# Patient Record
Sex: FEMALE | Race: BLACK OR AFRICAN AMERICAN | NOT HISPANIC OR LATINO | URBAN - METROPOLITAN AREA
[De-identification: names, ages, dates, MRNs, and addresses within clinical notes are randomized per-mention and may not be internally consistent; named-entity substitution may affect disease eponyms.]

---

## 2017-09-06 ENCOUNTER — OUTPATIENT (OUTPATIENT)
Dept: OUTPATIENT SERVICES | Facility: HOSPITAL | Age: 47
LOS: 1 days | End: 2017-09-06
Payer: COMMERCIAL

## 2017-09-06 VITALS
SYSTOLIC BLOOD PRESSURE: 120 MMHG | RESPIRATION RATE: 16 BRPM | OXYGEN SATURATION: 99 % | HEIGHT: 65 IN | DIASTOLIC BLOOD PRESSURE: 70 MMHG | TEMPERATURE: 99 F | WEIGHT: 242.95 LBS | HEART RATE: 70 BPM

## 2017-09-06 DIAGNOSIS — D25.9 LEIOMYOMA OF UTERUS, UNSPECIFIED: ICD-10-CM

## 2017-09-06 DIAGNOSIS — E66.9 OBESITY, UNSPECIFIED: ICD-10-CM

## 2017-09-06 DIAGNOSIS — Z98.890 OTHER SPECIFIED POSTPROCEDURAL STATES: Chronic | ICD-10-CM

## 2017-09-06 LAB
BLD GP AB SCN SERPL QL: NEGATIVE — SIGNIFICANT CHANGE UP
BUN SERPL-MCNC: 9 MG/DL — SIGNIFICANT CHANGE UP (ref 7–23)
CALCIUM SERPL-MCNC: 9.3 MG/DL — SIGNIFICANT CHANGE UP (ref 8.4–10.5)
CHLORIDE SERPL-SCNC: 105 MMOL/L — SIGNIFICANT CHANGE UP (ref 98–107)
CO2 SERPL-SCNC: 26 MMOL/L — SIGNIFICANT CHANGE UP (ref 22–31)
CREAT SERPL-MCNC: 0.83 MG/DL — SIGNIFICANT CHANGE UP (ref 0.5–1.3)
GLUCOSE SERPL-MCNC: 107 MG/DL — HIGH (ref 70–99)
HCT VFR BLD CALC: 36.6 % — SIGNIFICANT CHANGE UP (ref 34.5–45)
HGB BLD-MCNC: 11.5 G/DL — SIGNIFICANT CHANGE UP (ref 11.5–15.5)
MCHC RBC-ENTMCNC: 28.8 PG — SIGNIFICANT CHANGE UP (ref 27–34)
MCHC RBC-ENTMCNC: 31.4 % — LOW (ref 32–36)
MCV RBC AUTO: 91.5 FL — SIGNIFICANT CHANGE UP (ref 80–100)
NRBC # FLD: 0 — SIGNIFICANT CHANGE UP
PLATELET # BLD AUTO: 238 K/UL — SIGNIFICANT CHANGE UP (ref 150–400)
PMV BLD: 10.2 FL — SIGNIFICANT CHANGE UP (ref 7–13)
POTASSIUM SERPL-MCNC: 3.6 MMOL/L — SIGNIFICANT CHANGE UP (ref 3.5–5.3)
POTASSIUM SERPL-SCNC: 3.6 MMOL/L — SIGNIFICANT CHANGE UP (ref 3.5–5.3)
RBC # BLD: 4 M/UL — SIGNIFICANT CHANGE UP (ref 3.8–5.2)
RBC # FLD: 13.7 % — SIGNIFICANT CHANGE UP (ref 10.3–14.5)
RH IG SCN BLD-IMP: NEGATIVE — SIGNIFICANT CHANGE UP
SODIUM SERPL-SCNC: 145 MMOL/L — SIGNIFICANT CHANGE UP (ref 135–145)
WBC # BLD: 3.9 K/UL — SIGNIFICANT CHANGE UP (ref 3.8–10.5)
WBC # FLD AUTO: 3.9 K/UL — SIGNIFICANT CHANGE UP (ref 3.8–10.5)

## 2017-09-06 PROCEDURE — 93010 ELECTROCARDIOGRAM REPORT: CPT

## 2017-09-06 RX ORDER — SODIUM CHLORIDE 9 MG/ML
1000 INJECTION, SOLUTION INTRAVENOUS
Qty: 0 | Refills: 0 | Status: DISCONTINUED | OUTPATIENT
Start: 2017-09-20 | End: 2017-09-22

## 2017-09-06 RX ORDER — SODIUM CHLORIDE 9 MG/ML
3 INJECTION INTRAMUSCULAR; INTRAVENOUS; SUBCUTANEOUS EVERY 8 HOURS
Qty: 0 | Refills: 0 | Status: DISCONTINUED | OUTPATIENT
Start: 2017-09-20 | End: 2017-09-22

## 2017-09-06 NOTE — H&P PST ADULT - RS GEN PE MLT RESP DETAILS PC
clear to auscultation bilaterally/good air movement/respirations non-labored/airway patent/breath sounds equal

## 2017-09-06 NOTE — H&P PST ADULT - LYMPHATIC
supraclavicular L/supraclavicular R/posterior cervical L/anterior cervical L/posterior cervical R/anterior cervical R

## 2017-09-06 NOTE — H&P PST ADULT - PROBLEM SELECTOR PLAN 1
Total Hysterectomy, Bilateral Salpingectomy, Right Oophorectomy scheduled for 9/20/2017.  Pre-op instructions given. Pt verbalized understanding.  Pepci Total Hysterectomy, Bilateral Salpingectomy, Right Oophorectomy scheduled for 9/20/2017.  Pre-op instructions given. Pt verbalized understanding.  Pepcid given for GI prophylaxis.  Chlorhexidine wash instructions given.  ABO ordered STAT for day of procedure.  UCG ordered STAT for day of procedure - urine container given.  Accu-Chek ordered STAT for day of procedure.  Medical clearance requested - copy of Echo/Stress results requested if available.

## 2017-09-06 NOTE — H&P PST ADULT - HISTORY OF PRESENT ILLNESS
46yo obese female with medical history fibroids x 2 years with increase in size noted with associated heavy vaginal bleeding. Pt presents today for presurgical evaluation for Total Hysterectomy, Bilateral Salpingectomy, Right Oophorectomy scheduled for 9/20/2017.

## 2017-09-20 ENCOUNTER — INPATIENT (INPATIENT)
Facility: HOSPITAL | Age: 47
LOS: 1 days | Discharge: HOME | End: 2017-09-22
Attending: OBSTETRICS & GYNECOLOGY | Admitting: OBSTETRICS & GYNECOLOGY
Payer: COMMERCIAL

## 2017-09-20 VITALS
HEART RATE: 58 BPM | DIASTOLIC BLOOD PRESSURE: 70 MMHG | HEIGHT: 65 IN | OXYGEN SATURATION: 99 % | TEMPERATURE: 98 F | WEIGHT: 242.95 LBS | SYSTOLIC BLOOD PRESSURE: 120 MMHG | RESPIRATION RATE: 16 BRPM

## 2017-09-20 DIAGNOSIS — D25.9 LEIOMYOMA OF UTERUS, UNSPECIFIED: ICD-10-CM

## 2017-09-20 DIAGNOSIS — Z98.890 OTHER SPECIFIED POSTPROCEDURAL STATES: Chronic | ICD-10-CM

## 2017-09-20 DIAGNOSIS — Z98.890 OTHER SPECIFIED POSTPROCEDURAL STATES: ICD-10-CM

## 2017-09-20 LAB
BASE EXCESS BLDA CALC-SCNC: 1.6 MMOL/L — SIGNIFICANT CHANGE UP
CA-I BLDA-SCNC: 1.19 MMOL/L — SIGNIFICANT CHANGE UP (ref 1.15–1.29)
GLUCOSE BLDA-MCNC: 151 MG/DL — HIGH (ref 70–99)
HCO3 BLDA-SCNC: 26 MMOL/L — SIGNIFICANT CHANGE UP (ref 22–26)
HCT VFR BLD CALC: 29.3 % — LOW (ref 34.5–45)
HCT VFR BLDA CALC: 30.7 % — LOW (ref 34.5–46.5)
HGB BLD-MCNC: 9.7 G/DL — LOW (ref 11.5–15.5)
HGB BLDA-MCNC: 9.9 G/DL — LOW (ref 11.5–15.5)
MCHC RBC-ENTMCNC: 29.7 PG — SIGNIFICANT CHANGE UP (ref 27–34)
MCHC RBC-ENTMCNC: 33.1 % — SIGNIFICANT CHANGE UP (ref 32–36)
MCV RBC AUTO: 89.6 FL — SIGNIFICANT CHANGE UP (ref 80–100)
NRBC # FLD: 0 — SIGNIFICANT CHANGE UP
PCO2 BLDA: 45 MMHG — SIGNIFICANT CHANGE UP (ref 32–48)
PH BLDA: 7.38 PH — SIGNIFICANT CHANGE UP (ref 7.35–7.45)
PLATELET # BLD AUTO: 260 K/UL — SIGNIFICANT CHANGE UP (ref 150–400)
PMV BLD: 9.4 FL — SIGNIFICANT CHANGE UP (ref 7–13)
PO2 BLDA: 68 MMHG — LOW (ref 83–108)
POTASSIUM BLDA-SCNC: 3.6 MMOL/L — SIGNIFICANT CHANGE UP (ref 3.4–4.5)
RBC # BLD: 3.27 M/UL — LOW (ref 3.8–5.2)
RBC # FLD: 13.2 % — SIGNIFICANT CHANGE UP (ref 10.3–14.5)
RH IG SCN BLD-IMP: NEGATIVE — SIGNIFICANT CHANGE UP
SAO2 % BLDA: 91.7 % — LOW (ref 95–99)
SODIUM BLDA-SCNC: 138 MMOL/L — SIGNIFICANT CHANGE UP (ref 136–146)
WBC # BLD: 12.15 K/UL — HIGH (ref 3.8–10.5)
WBC # FLD AUTO: 12.15 K/UL — HIGH (ref 3.8–10.5)

## 2017-09-20 PROCEDURE — 88307 TISSUE EXAM BY PATHOLOGIST: CPT | Mod: 26

## 2017-09-20 RX ORDER — DOCUSATE SODIUM 100 MG
100 CAPSULE ORAL THREE TIMES A DAY
Qty: 0 | Refills: 0 | Status: DISCONTINUED | OUTPATIENT
Start: 2017-09-20 | End: 2017-09-21

## 2017-09-20 RX ORDER — DOCUSATE SODIUM 100 MG
100 CAPSULE ORAL THREE TIMES A DAY
Qty: 0 | Refills: 0 | Status: DISCONTINUED | OUTPATIENT
Start: 2017-09-20 | End: 2017-09-22

## 2017-09-20 RX ORDER — SIMETHICONE 80 MG/1
80 TABLET, CHEWABLE ORAL
Qty: 0 | Refills: 0 | Status: DISCONTINUED | OUTPATIENT
Start: 2017-09-20 | End: 2017-09-22

## 2017-09-20 RX ORDER — NALOXONE HYDROCHLORIDE 4 MG/.1ML
0.1 SPRAY NASAL
Qty: 0 | Refills: 0 | Status: DISCONTINUED | OUTPATIENT
Start: 2017-09-20 | End: 2017-09-22

## 2017-09-20 RX ORDER — HYDROMORPHONE HYDROCHLORIDE 2 MG/ML
30 INJECTION INTRAMUSCULAR; INTRAVENOUS; SUBCUTANEOUS
Qty: 0 | Refills: 0 | Status: DISCONTINUED | OUTPATIENT
Start: 2017-09-20 | End: 2017-09-21

## 2017-09-20 RX ORDER — ONDANSETRON 8 MG/1
4 TABLET, FILM COATED ORAL EVERY 6 HOURS
Qty: 0 | Refills: 0 | Status: DISCONTINUED | OUTPATIENT
Start: 2017-09-20 | End: 2017-09-22

## 2017-09-20 RX ORDER — HYDROMORPHONE HYDROCHLORIDE 2 MG/ML
0.5 INJECTION INTRAMUSCULAR; INTRAVENOUS; SUBCUTANEOUS
Qty: 0 | Refills: 0 | Status: DISCONTINUED | OUTPATIENT
Start: 2017-09-20 | End: 2017-09-21

## 2017-09-20 RX ORDER — ACETAMINOPHEN 500 MG
1000 TABLET ORAL ONCE
Qty: 0 | Refills: 0 | Status: COMPLETED | OUTPATIENT
Start: 2017-09-21 | End: 2017-09-21

## 2017-09-20 RX ORDER — SODIUM CHLORIDE 9 MG/ML
1000 INJECTION, SOLUTION INTRAVENOUS
Qty: 0 | Refills: 0 | Status: DISCONTINUED | OUTPATIENT
Start: 2017-09-20 | End: 2017-09-22

## 2017-09-20 RX ORDER — DIPHENHYDRAMINE HCL 50 MG
25 CAPSULE ORAL EVERY 4 HOURS
Qty: 0 | Refills: 0 | Status: DISCONTINUED | OUTPATIENT
Start: 2017-09-20 | End: 2017-09-22

## 2017-09-20 RX ADMIN — HYDROMORPHONE HYDROCHLORIDE 0.5 MILLIGRAM(S): 2 INJECTION INTRAMUSCULAR; INTRAVENOUS; SUBCUTANEOUS at 21:30

## 2017-09-20 RX ADMIN — HYDROMORPHONE HYDROCHLORIDE 0.5 MILLIGRAM(S): 2 INJECTION INTRAMUSCULAR; INTRAVENOUS; SUBCUTANEOUS at 21:15

## 2017-09-20 RX ADMIN — SODIUM CHLORIDE 125 MILLILITER(S): 9 INJECTION, SOLUTION INTRAVENOUS at 23:12

## 2017-09-20 RX ADMIN — SIMETHICONE 80 MILLIGRAM(S): 80 TABLET, CHEWABLE ORAL at 23:58

## 2017-09-20 RX ADMIN — SODIUM CHLORIDE 125 MILLILITER(S): 9 INJECTION, SOLUTION INTRAVENOUS at 20:16

## 2017-09-20 RX ADMIN — HYDROMORPHONE HYDROCHLORIDE 30 MILLILITER(S): 2 INJECTION INTRAMUSCULAR; INTRAVENOUS; SUBCUTANEOUS at 22:00

## 2017-09-20 RX ADMIN — HYDROMORPHONE HYDROCHLORIDE 30 MILLILITER(S): 2 INJECTION INTRAMUSCULAR; INTRAVENOUS; SUBCUTANEOUS at 23:13

## 2017-09-20 NOTE — BRIEF OPERATIVE NOTE - PROCEDURE
<<-----Click on this checkbox to enter Procedure Myomectomy by abdominal approach  09/20/2017    Active  YOLANDA  Lysis of adhesions  09/20/2017    Active  YOLANDA  Right oophorectomy  09/20/2017    Active  YOLANDA  Hysterectomy, abdominal, total, with bilateral salpingectomy  09/20/2017    Active  YOLANDA

## 2017-09-20 NOTE — BRIEF OPERATIVE NOTE - OPERATION/FINDINGS
26 week size fibroid uterus. dense peritoneal, omental, bowel adhesions to uterus.  Ovaries and fallopian tubes adherent to uterus.

## 2017-09-21 LAB
BUN SERPL-MCNC: 9 MG/DL — SIGNIFICANT CHANGE UP (ref 7–23)
CALCIUM SERPL-MCNC: 8.4 MG/DL — SIGNIFICANT CHANGE UP (ref 8.4–10.5)
CHLORIDE SERPL-SCNC: 100 MMOL/L — SIGNIFICANT CHANGE UP (ref 98–107)
CO2 SERPL-SCNC: 24 MMOL/L — SIGNIFICANT CHANGE UP (ref 22–31)
CREAT SERPL-MCNC: 0.94 MG/DL — SIGNIFICANT CHANGE UP (ref 0.5–1.3)
GLUCOSE SERPL-MCNC: 180 MG/DL — HIGH (ref 70–99)
HCG UR QL: NEGATIVE — SIGNIFICANT CHANGE UP
HCT VFR BLD CALC: 26.6 % — LOW (ref 34.5–45)
HGB BLD-MCNC: 8.7 G/DL — LOW (ref 11.5–15.5)
MCHC RBC-ENTMCNC: 29 PG — SIGNIFICANT CHANGE UP (ref 27–34)
MCHC RBC-ENTMCNC: 32.7 % — SIGNIFICANT CHANGE UP (ref 32–36)
MCV RBC AUTO: 88.7 FL — SIGNIFICANT CHANGE UP (ref 80–100)
NRBC # FLD: 0 — SIGNIFICANT CHANGE UP
PLATELET # BLD AUTO: 255 K/UL — SIGNIFICANT CHANGE UP (ref 150–400)
PMV BLD: 9.5 FL — SIGNIFICANT CHANGE UP (ref 7–13)
POTASSIUM SERPL-MCNC: 4 MMOL/L — SIGNIFICANT CHANGE UP (ref 3.5–5.3)
POTASSIUM SERPL-SCNC: 4 MMOL/L — SIGNIFICANT CHANGE UP (ref 3.5–5.3)
RBC # BLD: 3 M/UL — LOW (ref 3.8–5.2)
RBC # FLD: 13.3 % — SIGNIFICANT CHANGE UP (ref 10.3–14.5)
SODIUM SERPL-SCNC: 139 MMOL/L — SIGNIFICANT CHANGE UP (ref 135–145)
WBC # BLD: 11.31 K/UL — HIGH (ref 3.8–10.5)
WBC # FLD AUTO: 11.31 K/UL — HIGH (ref 3.8–10.5)

## 2017-09-21 RX ORDER — OXYCODONE HYDROCHLORIDE 5 MG/1
5 TABLET ORAL
Qty: 0 | Refills: 0 | Status: COMPLETED | OUTPATIENT
Start: 2017-09-21 | End: 2017-09-28

## 2017-09-21 RX ORDER — HEPARIN SODIUM 5000 [USP'U]/ML
5000 INJECTION INTRAVENOUS; SUBCUTANEOUS EVERY 8 HOURS
Qty: 0 | Refills: 0 | Status: DISCONTINUED | OUTPATIENT
Start: 2017-09-21 | End: 2017-09-22

## 2017-09-21 RX ORDER — OXYCODONE HYDROCHLORIDE 5 MG/1
5 TABLET ORAL EVERY 4 HOURS
Qty: 0 | Refills: 0 | Status: DISCONTINUED | OUTPATIENT
Start: 2017-09-21 | End: 2017-09-22

## 2017-09-21 RX ORDER — OXYCODONE HYDROCHLORIDE 5 MG/1
5 TABLET ORAL
Qty: 0 | Refills: 0 | Status: DISCONTINUED | OUTPATIENT
Start: 2017-09-21 | End: 2017-09-21

## 2017-09-21 RX ORDER — IBUPROFEN 200 MG
600 TABLET ORAL EVERY 6 HOURS
Qty: 0 | Refills: 0 | Status: DISCONTINUED | OUTPATIENT
Start: 2017-09-21 | End: 2017-09-21

## 2017-09-21 RX ORDER — OXYCODONE HYDROCHLORIDE 5 MG/1
5 TABLET ORAL
Qty: 0 | Refills: 0 | Status: DISCONTINUED | OUTPATIENT
Start: 2017-09-21 | End: 2017-09-22

## 2017-09-21 RX ORDER — IBUPROFEN 200 MG
600 TABLET ORAL EVERY 6 HOURS
Qty: 0 | Refills: 0 | Status: DISCONTINUED | OUTPATIENT
Start: 2017-09-21 | End: 2017-09-22

## 2017-09-21 RX ORDER — ACETAMINOPHEN 500 MG
975 TABLET ORAL EVERY 6 HOURS
Qty: 0 | Refills: 0 | Status: DISCONTINUED | OUTPATIENT
Start: 2017-09-21 | End: 2017-09-22

## 2017-09-21 RX ORDER — OXYCODONE HYDROCHLORIDE 5 MG/1
5 TABLET ORAL EVERY 4 HOURS
Qty: 0 | Refills: 0 | Status: COMPLETED | OUTPATIENT
Start: 2017-09-21 | End: 2017-09-28

## 2017-09-21 RX ORDER — OXYCODONE HYDROCHLORIDE 5 MG/1
5 TABLET ORAL EVERY 4 HOURS
Qty: 0 | Refills: 0 | Status: DISCONTINUED | OUTPATIENT
Start: 2017-09-21 | End: 2017-09-21

## 2017-09-21 RX ORDER — HEPARIN SODIUM 5000 [USP'U]/ML
5000 INJECTION INTRAVENOUS; SUBCUTANEOUS EVERY 8 HOURS
Qty: 0 | Refills: 0 | Status: DISCONTINUED | OUTPATIENT
Start: 2017-09-21 | End: 2017-09-21

## 2017-09-21 RX ORDER — ACETAMINOPHEN 500 MG
975 TABLET ORAL EVERY 6 HOURS
Qty: 0 | Refills: 0 | Status: DISCONTINUED | OUTPATIENT
Start: 2017-09-21 | End: 2017-09-21

## 2017-09-21 RX ADMIN — SODIUM CHLORIDE 3 MILLILITER(S): 9 INJECTION INTRAMUSCULAR; INTRAVENOUS; SUBCUTANEOUS at 05:41

## 2017-09-21 RX ADMIN — Medication 100 MILLIGRAM(S): at 21:10

## 2017-09-21 RX ADMIN — Medication 975 MILLIGRAM(S): at 11:58

## 2017-09-21 RX ADMIN — SIMETHICONE 80 MILLIGRAM(S): 80 TABLET, CHEWABLE ORAL at 11:09

## 2017-09-21 RX ADMIN — Medication 100 MILLIGRAM(S): at 05:41

## 2017-09-21 RX ADMIN — Medication 100 MILLIGRAM(S): at 11:09

## 2017-09-21 RX ADMIN — SIMETHICONE 80 MILLIGRAM(S): 80 TABLET, CHEWABLE ORAL at 05:40

## 2017-09-21 RX ADMIN — SODIUM CHLORIDE 3 MILLILITER(S): 9 INJECTION INTRAMUSCULAR; INTRAVENOUS; SUBCUTANEOUS at 21:09

## 2017-09-21 RX ADMIN — OXYCODONE HYDROCHLORIDE 5 MILLIGRAM(S): 5 TABLET ORAL at 12:55

## 2017-09-21 RX ADMIN — OXYCODONE HYDROCHLORIDE 5 MILLIGRAM(S): 5 TABLET ORAL at 17:46

## 2017-09-21 RX ADMIN — HEPARIN SODIUM 5000 UNIT(S): 5000 INJECTION INTRAVENOUS; SUBCUTANEOUS at 19:32

## 2017-09-21 RX ADMIN — Medication 600 MILLIGRAM(S): at 12:56

## 2017-09-21 RX ADMIN — OXYCODONE HYDROCHLORIDE 5 MILLIGRAM(S): 5 TABLET ORAL at 13:30

## 2017-09-21 RX ADMIN — Medication 975 MILLIGRAM(S): at 11:09

## 2017-09-21 RX ADMIN — Medication 600 MILLIGRAM(S): at 18:20

## 2017-09-21 RX ADMIN — OXYCODONE HYDROCHLORIDE 5 MILLIGRAM(S): 5 TABLET ORAL at 21:11

## 2017-09-21 RX ADMIN — SODIUM CHLORIDE 125 MILLILITER(S): 9 INJECTION, SOLUTION INTRAVENOUS at 05:41

## 2017-09-21 RX ADMIN — Medication 975 MILLIGRAM(S): at 17:46

## 2017-09-21 RX ADMIN — Medication 400 MILLIGRAM(S): at 01:11

## 2017-09-21 RX ADMIN — Medication 600 MILLIGRAM(S): at 13:30

## 2017-09-21 RX ADMIN — OXYCODONE HYDROCHLORIDE 5 MILLIGRAM(S): 5 TABLET ORAL at 18:20

## 2017-09-21 RX ADMIN — SODIUM CHLORIDE 125 MILLILITER(S): 9 INJECTION, SOLUTION INTRAVENOUS at 12:56

## 2017-09-21 RX ADMIN — OXYCODONE HYDROCHLORIDE 5 MILLIGRAM(S): 5 TABLET ORAL at 21:41

## 2017-09-21 RX ADMIN — SODIUM CHLORIDE 3 MILLILITER(S): 9 INJECTION INTRAMUSCULAR; INTRAVENOUS; SUBCUTANEOUS at 14:01

## 2017-09-21 RX ADMIN — Medication 1000 MILLIGRAM(S): at 01:41

## 2017-09-21 RX ADMIN — SIMETHICONE 80 MILLIGRAM(S): 80 TABLET, CHEWABLE ORAL at 17:46

## 2017-09-21 RX ADMIN — Medication 600 MILLIGRAM(S): at 17:46

## 2017-09-21 NOTE — DISCHARGE NOTE ADULT - MEDICATION SUMMARY - MEDICATIONS TO TAKE
I will START or STAY ON the medications listed below when I get home from the hospital:    iron  -- 1 tab(s) by mouth once a day in morning  -- Indication: For Home medication    ibuprofen 600 mg oral tablet  -- 1 tab(s) by mouth every 6 hours  -- Indication: For Pain    docusate sodium 100 mg oral capsule  -- 1 cap(s) by mouth 3 times a day  -- Indication: For Constipation    simethicone 80 mg oral tablet, chewable  -- 1 tab(s) by mouth 4 times a day  -- Indication: For Gas pain I will START or STAY ON the medications listed below when I get home from the hospital:    iron  -- 1 tab(s) by mouth once a day in morning  -- Indication: For Home medication    Percocet 5/325 oral tablet  -- 1 tab(s) by mouth every 4 hours, As Needed -for severe pain MDD:6 tabs   -- Caution federal law prohibits the transfer of this drug to any person other  than the person for whom it was prescribed.  May cause drowsiness.  Alcohol may intensify this effect.  Use care when operating dangerous machinery.  This prescription cannot be refilled.  This product contains acetaminophen.  Do not use  with any other product containing acetaminophen to prevent possible liver damage.  Using more of this medication than prescribed may cause serious breathing problems.    -- Indication: For Pain     ibuprofen 600 mg oral tablet  -- 1 tab(s) by mouth every 6 hours  -- Indication: For Pain    docusate sodium 100 mg oral capsule  -- 1 cap(s) by mouth 3 times a day  -- Indication: For Constipation    simethicone 80 mg oral tablet, chewable  -- 1 tab(s) by mouth 4 times a day  -- Indication: For Gas pain

## 2017-09-21 NOTE — DISCHARGE NOTE ADULT - HOSPITAL COURSE
[facility]    [address] [place] [address]    Phone:  [phone]       Thank You for choosing us for your health care visit. We are glad to serve you and happy to provide you with this summary of your visit. Please help us to ensure we have accurate records. If you find anything that needs to be changed, please let our staff know as soon as possible.          Your Demographic Information     Patient Name Sex Lorraine Stewart Female 1973       Ethnic Group Patient Race    Not of  or  Origin White      Your Visit Details     Date & Time Provider Department    2017 5:00 PM Mikayla Gutierrez, OD [facility]      Your Vitals Were     BP Pulse Smoking Status             110/79 (BP Location: Arterial, Patient Position: Sitting) 81 Former Smoker         Medications Prescribed or Re-Ordered Today     None      Your Current Medications Are        Disp Refills Start End    Multiple Vitamins-Minerals (WOMENS MULTI PO)        Class: Historical Med    Route: Oral    desogestrel-ethinyl estradiol (APRI) 0.15-30 MG-MCG per tablet 112 tablet 4 2017     Sig - Route: Take 1 tablet by mouth daily. - Oral    Class: Eprescribe    Notes to Pharmacy: Patient takes continuously    IBUPROFEN PO        Sig - Route: Take  by mouth. - Oral    Class: Historical Med      Allergies     Amoxicillin HIVES    Epinephrine ANXIETY    (tachycardia)    Penicillins HIVES    Sulfa Antibiotics RASH      Immunizations History as of 2017     Name Date    Influenza 2015            Patient Instructions     None       48yo female admitted 9/20/17 for hysterectomy due to fibroid uterus.  Patient underwent total abdominal hysterectomy with myomectomy and extensive lysis of adhesions as well as bilateral salpingectomy and right oophorectomy.  Procedure was uncomplicated. EBL 1200 cc.  Hct: 36.6->29.3->26.6.  Post-operative course was uncomplicated.  On the day of discharge the patient was stable and afebrile with pain well controlled by oral pain meds, and she was ambulating, voiding, passing flatus, and tolerating oral intake.  Patient to follow up in two weeks with Dr. Grewal.

## 2017-09-21 NOTE — DISCHARGE NOTE ADULT - CONDITIONS AT DISCHARGE
Pt is alert and oriented. Vital signs are stable. Pt is ambulating and voiding.   Pt tolerating regular diet; no N/V.

## 2017-09-21 NOTE — DISCHARGE NOTE ADULT - PLAN OF CARE
Wellness Please follow up with Dr. Grewal in two weeks for post-operative checkup.  Nothing in the vagina for 6 weeks including no tampons, no douching and no intercourse.  No tub baths for 6 weeks.

## 2017-09-21 NOTE — DISCHARGE NOTE ADULT - PATIENT PORTAL LINK FT
“You can access the Follow[facility] Patient Portal, offered by [facility], by registering with the following website: [email or web address]”

## 2017-09-21 NOTE — DISCHARGE NOTE ADULT - CARE PLAN
Principal Discharge DX:	Uterine leiomyoma, unspecified location  Goal:	Wellness  Instructions for follow-up, activity and diet:	Please follow up with Dr. Grewal in two weeks for post-operative checkup.  Nothing in the vagina for 6 weeks including no tampons, no douching and no intercourse.  No tub baths for 6 weeks.

## 2017-09-21 NOTE — DISCHARGE NOTE ADULT - CARE PROVIDER_API CALL
Ramandeep Grewal (MD), Obstetrics and Gynecology  [address] [place], [place] [address]  Phone: [phone]  Fax: [phone]

## 2017-09-21 NOTE — DISCHARGE NOTE ADULT - INSTRUCTIONS
Regular diet Call MD or go to ER for:   - pain unrelieved by prescribed pain meds   - any excessive bleeding, pus/ drainage from incision site   -temp greater than 100.4  - persistent nausea  - vomiting

## 2017-09-22 VITALS
TEMPERATURE: 98 F | RESPIRATION RATE: 18 BRPM | HEART RATE: 61 BPM | SYSTOLIC BLOOD PRESSURE: 106 MMHG | OXYGEN SATURATION: 99 %

## 2017-09-22 LAB
BUN SERPL-MCNC: 11 MG/DL — SIGNIFICANT CHANGE UP (ref 7–23)
CALCIUM SERPL-MCNC: 8.1 MG/DL — LOW (ref 8.4–10.5)
CHLORIDE SERPL-SCNC: 106 MMOL/L — SIGNIFICANT CHANGE UP (ref 98–107)
CO2 SERPL-SCNC: 27 MMOL/L — SIGNIFICANT CHANGE UP (ref 22–31)
CREAT SERPL-MCNC: 0.78 MG/DL — SIGNIFICANT CHANGE UP (ref 0.5–1.3)
GLUCOSE SERPL-MCNC: 101 MG/DL — HIGH (ref 70–99)
POTASSIUM SERPL-MCNC: 3.7 MMOL/L — SIGNIFICANT CHANGE UP (ref 3.5–5.3)
POTASSIUM SERPL-SCNC: 3.7 MMOL/L — SIGNIFICANT CHANGE UP (ref 3.5–5.3)
SODIUM SERPL-SCNC: 145 MMOL/L — SIGNIFICANT CHANGE UP (ref 135–145)

## 2017-09-22 RX ORDER — IBUPROFEN 200 MG
1 TABLET ORAL
Qty: 0 | Refills: 0 | COMMUNITY
Start: 2017-09-22

## 2017-09-22 RX ORDER — DOCUSATE SODIUM 100 MG
1 CAPSULE ORAL
Qty: 0 | Refills: 0 | COMMUNITY
Start: 2017-09-22

## 2017-09-22 RX ORDER — SIMETHICONE 80 MG/1
1 TABLET, CHEWABLE ORAL
Qty: 0 | Refills: 0 | COMMUNITY
Start: 2017-09-22

## 2017-09-22 RX ADMIN — OXYCODONE HYDROCHLORIDE 5 MILLIGRAM(S): 5 TABLET ORAL at 00:13

## 2017-09-22 RX ADMIN — Medication 600 MILLIGRAM(S): at 06:30

## 2017-09-22 RX ADMIN — Medication 100 MILLIGRAM(S): at 06:08

## 2017-09-22 RX ADMIN — HEPARIN SODIUM 5000 UNIT(S): 5000 INJECTION INTRAVENOUS; SUBCUTANEOUS at 03:13

## 2017-09-22 RX ADMIN — Medication 975 MILLIGRAM(S): at 00:13

## 2017-09-22 RX ADMIN — OXYCODONE HYDROCHLORIDE 5 MILLIGRAM(S): 5 TABLET ORAL at 09:12

## 2017-09-22 RX ADMIN — OXYCODONE HYDROCHLORIDE 5 MILLIGRAM(S): 5 TABLET ORAL at 01:05

## 2017-09-22 RX ADMIN — Medication 600 MILLIGRAM(S): at 01:05

## 2017-09-22 RX ADMIN — SIMETHICONE 80 MILLIGRAM(S): 80 TABLET, CHEWABLE ORAL at 00:13

## 2017-09-22 RX ADMIN — OXYCODONE HYDROCHLORIDE 5 MILLIGRAM(S): 5 TABLET ORAL at 09:50

## 2017-09-22 RX ADMIN — Medication 975 MILLIGRAM(S): at 06:07

## 2017-09-22 RX ADMIN — OXYCODONE HYDROCHLORIDE 5 MILLIGRAM(S): 5 TABLET ORAL at 04:10

## 2017-09-22 RX ADMIN — OXYCODONE HYDROCHLORIDE 5 MILLIGRAM(S): 5 TABLET ORAL at 06:30

## 2017-09-22 RX ADMIN — Medication 600 MILLIGRAM(S): at 06:08

## 2017-09-22 RX ADMIN — SIMETHICONE 80 MILLIGRAM(S): 80 TABLET, CHEWABLE ORAL at 06:08

## 2017-09-22 RX ADMIN — SODIUM CHLORIDE 3 MILLILITER(S): 9 INJECTION INTRAMUSCULAR; INTRAVENOUS; SUBCUTANEOUS at 06:10

## 2017-09-22 RX ADMIN — Medication 600 MILLIGRAM(S): at 00:12

## 2017-09-22 RX ADMIN — OXYCODONE HYDROCHLORIDE 5 MILLIGRAM(S): 5 TABLET ORAL at 06:09

## 2017-09-22 RX ADMIN — OXYCODONE HYDROCHLORIDE 5 MILLIGRAM(S): 5 TABLET ORAL at 03:13

## 2017-09-22 NOTE — PROGRESS NOTE ADULT - PROVIDER SPECIALTY LIST ADULT
Anesthesia
Date: 2/2/2019    Patient Name: Guzman Cardoso          To Whom it may concern: This letter has been written at the patient's request. The above patient was seen at the [facility] for treatment of a medical condition.     This patient sh
GYN
GYN
OB
GYN

## 2017-09-22 NOTE — PROGRESS NOTE ADULT - SUBJECTIVE AND OBJECTIVE BOX
Anesthesia Pain Management Service    SUBJECTIVE: Patient is doing well with IV PCA and no significant problems reported.    Pain Scale Score	At rest: ___ 	With Activity: ___ 	[X ] Refer to charted pain scores    THERAPY:    [ ] IV PCA Morphine		[ X] 5 mg/mL	[ ] 1 mg/mL  [x ] IV PCA Hydromorphone	[ ] 5 mg/mL	[ ] 1 mg/mL  [ ] IV PCA Fentanyl		[ ] 50 micrograms/mL    Demand dose __.2_ lockout __6_ (minutes) Continuous Rate _0__ Total: ___  Daily      MEDICATIONS  (STANDING):  sodium chloride 0.9% lock flush 3 milliLiter(s) IV Push every 8 hours  lactated ringers. 1000 milliLiter(s) (30 mL/Hr) IV Continuous <Continuous>  lactated ringers. 1000 milliLiter(s) (125 mL/Hr) IV Continuous <Continuous>  simethicone 80 milliGRAM(s) Chew four times a day  docusate sodium 100 milliGRAM(s) Oral three times a day  bisacodyl Suppository 10 milliGRAM(s) Rectal once  oxyCODONE    IR 5 milliGRAM(s) Oral every 3 hours  acetaminophen   Tablet 975 milliGRAM(s) Oral every 6 hours  ibuprofen  Tablet 600 milliGRAM(s) Oral every 6 hours    MEDICATIONS  (PRN):  naloxone Injectable 0.1 milliGRAM(s) IV Push every 3 minutes PRN For ANY of the following changes in patient status:  A. RR LESS THAN 10 breaths per minute, B. Oxygen saturation LESS THAN 90%, C. Sedation score of 6  ondansetron Injectable 4 milliGRAM(s) IV Push every 6 hours PRN Nausea  diphenhydrAMINE   Injectable 25 milliGRAM(s) IV Push every 4 hours PRN Pruritus  oxyCODONE    IR 5 milliGRAM(s) Oral every 4 hours PRN Severe Pain (7 - 10)      OBJECTIVE:    Sedation Score:	[ X] Alert	[ ] Drowsy 	[ ] Arousable	[ ] Asleep	[ ] Unresponsive    Side Effects:	[X ] None	[ ] Nausea	[ ] Vomiting	[ ] Pruritus  		[ ] Other:    Vital Signs Last 24 Hrs  T(C): 36.8 (21 Sep 2017 09:40), Max: 36.9 (20 Sep 2017 20:00)  T(F): 98.2 (21 Sep 2017 09:40), Max: 98.4 (20 Sep 2017 20:00)  HR: 75 (21 Sep 2017 09:40) (70 - 89)  BP: 118/65 (21 Sep 2017 09:40) (95/46 - 129/71)  BP(mean): --  RR: 17 (21 Sep 2017 09:40) (12 - 20)  SpO2: 100% (21 Sep 2017 09:40) (96% - 100%)    ASSESSMENT/ PLAN    Therapy to  be:	[ ] Continue   [X ] Discontinued   [ X] Change to prn Analgesics    Documentation and Verification of current medications:   [X] Done	[ ] Not done, not elligible    Comments: PRN Oral/IV opioids and/or Adjuvant medication to be ordered at this point.
POD# 2  HD#3    Patient seen and examined at bedside. No acute overnight events. No acute complaints, pain well controlled.  Patient is ambulating. She is passing flatus. Voiding spontaneously and tolerating regular diet. Denies CP, SOB, N/V, fevers, and chills.    Vital Signs Last 24 Hours  T(C): 36.6 (09-22-17 @ 05:19), Max: 37.4 (09-21-17 @ 17:44)  HR: 61 (09-22-17 @ 05:19) (61 - 86)  BP: 106/- (09-22-17 @ 05:19) (102/56 - 121/66)  RR: 18 (09-22-17 @ 05:19) (17 - 18)  SpO2: 99% (09-22-17 @ 05:19) (97% - 100%)    I&O's Summary    21 Sep 2017 07:01  -  22 Sep 2017 07:00  --------------------------------------------------------  IN: 0 mL / OUT: 1950 mL / NET: -1950 mL        Physical Exam:  General: NAD  CV: RRR  Lungs: CTAB  Abdomen: Soft, non-tender, non-distended, +BS  Incision: vertical incision, C/D/I  Ext: No pain or swelling    Labs:                        8.7    11.31 )-----------( 255      ( 21 Sep 2017 04:44 )             26.6   baso x      eos x      imm gran x      lymph x      mono x      poly x                            9.7    12.15 )-----------( 260      ( 20 Sep 2017 23:40 )             29.3   baso x      eos x      imm gran x      lymph x      mono x      poly x          MEDICATIONS  (STANDING):  sodium chloride 0.9% lock flush 3 milliLiter(s) IV Push every 8 hours  lactated ringers. 1000 milliLiter(s) (30 mL/Hr) IV Continuous <Continuous>  lactated ringers. 1000 milliLiter(s) (125 mL/Hr) IV Continuous <Continuous>  simethicone 80 milliGRAM(s) Chew four times a day  docusate sodium 100 milliGRAM(s) Oral three times a day  bisacodyl Suppository 10 milliGRAM(s) Rectal once  oxyCODONE    IR 5 milliGRAM(s) Oral every 3 hours  acetaminophen   Tablet 975 milliGRAM(s) Oral every 6 hours  ibuprofen  Tablet 600 milliGRAM(s) Oral every 6 hours  heparin  Injectable 5000 Unit(s) SubCutaneous every 8 hours    MEDICATIONS  (PRN):  naloxone Injectable 0.1 milliGRAM(s) IV Push every 3 minutes PRN For ANY of the following changes in patient status:  A. RR LESS THAN 10 breaths per minute, B. Oxygen saturation LESS THAN 90%, C. Sedation score of 6  ondansetron Injectable 4 milliGRAM(s) IV Push every 6 hours PRN Nausea  diphenhydrAMINE   Injectable 25 milliGRAM(s) IV Push every 4 hours PRN Pruritus  oxyCODONE    IR 5 milliGRAM(s) Oral every 4 hours PRN Severe Pain (7 - 10)
Patient is POD$1 S/P MAYDA/BS and right oophorectomy    Patient is afebrile and VSS. She has no complaints and denies any pain. She is ambulating well and reports spontaneous flatus  Incision od CDI + tape . NO evidence of infection. She is at the appropriate Hct for the blood loss  .  Ext wnl +edema   CONTINUE WITH ROUTINE POST OPERATIVE CARE
Patient seen and examined at bedside, no acute overnight events. No acute complaints, pain well controlled with PCA.  Patient has not yet ambulated, but was able to sit up in her bed without difficulty. She tolerating clears. Has not yet passed flatus. Truong is still in place. Denies CP, SOB, N/V, fevers, and chills.    Vital Signs Last 24 Hours  T(C): 36.8 (09-21-17 @ 05:37), Max: 36.9 (09-20-17 @ 20:00)  HR: 78 (09-21-17 @ 05:37) (58 - 89)  BP: 102/60 (09-21-17 @ 05:37) (95/46 - 129/71)  RR: 16 (09-21-17 @ 05:37) (12 - 20)  SpO2: 99% (09-21-17 @ 05:37) (96% - 100%)    I&O's Summary    20 Sep 2017 07:01  -  21 Sep 2017 06:28  --------------------------------------------------------  IN: 995 mL / OUT: 2200 mL / NET: -1205 mL        Physical Exam:  General: NAD  CV: NR, RR, S1, S2, no M/R/G  Lungs: CTA-B  Abdomen: Soft, appropriately tender, non-distended, hypoactive BS  Incision: vertical incision, CDI, dermabond dressing in place  Ext: No pain or swelling    Labs:             8.7<L>  11.31<H> )-----------( 255      ( 09-21 @ 04:44 )             26.6<L>               9.7<L>  12.15<H> )-----------( 260      ( 09-20 @ 23:40 )             29.3<L>        MEDICATIONS  (STANDING):  sodium chloride 0.9% lock flush 3 milliLiter(s) IV Push every 8 hours  lactated ringers. 1000 milliLiter(s) (30 mL/Hr) IV Continuous <Continuous>  HYDROmorphone PCA (1 mG/mL) 30 milliLiter(s) PCA Continuous PCA Continuous  lactated ringers. 1000 milliLiter(s) (125 mL/Hr) IV Continuous <Continuous>  simethicone 80 milliGRAM(s) Chew four times a day  docusate sodium 100 milliGRAM(s) Oral three times a day    MEDICATIONS  (PRN):  HYDROmorphone  Injectable 0.5 milliGRAM(s) IV Push every 10 minutes PRN Severe Pain (7 - 10)  HYDROmorphone PCA (1 mG/mL) Rescue Clinician Bolus 0.5 milliGRAM(s) IV Push every 15 minutes PRN for Pain Scale GREATER THAN 6  naloxone Injectable 0.1 milliGRAM(s) IV Push every 3 minutes PRN For ANY of the following changes in patient status:  A. RR LESS THAN 10 breaths per minute, B. Oxygen saturation LESS THAN 90%, C. Sedation score of 6  ondansetron Injectable 4 milliGRAM(s) IV Push every 6 hours PRN Nausea  diphenhydrAMINE   Injectable 25 milliGRAM(s) IV Push every 4 hours PRN Pruritus  docusate sodium 100 milliGRAM(s) Oral three times a day PRN Stool Softening
R2 OBGYN POST-OP CHECK    S: Patient seen and evaluated at bedside.  Pt awake and alert resting comfortaby in bed.   Patient reports pain controlled with analgesia. Pt denies N/V, SOB, CP, palpitations, fever/chills. Tolerating sips of water.  Not OOB yet.    O:   T(C): 36.4 (09-20-17 @ 23:10), Max: 36.9 (09-20-17 @ 20:00)  HR: 81 (09-20-17 @ 23:10) (70 - 89)  BP: 122/71 (09-20-17 @ 23:10) (95/46 - 129/71)  RR: 16 (09-20-17 @ 23:10) (12 - 20)  SpO2: 100% (09-20-17 @ 23:10) (96% - 100%)  Wt(kg): --  I&O's Summary    20 Sep 2017 07:01  -  20 Sep 2017 23:37  --------------------------------------------------------  IN: 375 mL / OUT: 500 mL / NET: -125 mL        Gen: Resting comfortably in bed, NAD  CV: S1S2, RRR  Lungs: CTA B/L  Abd: soft, appropriately tender, occasional BS x 4 quadrants.    Inc: vertical incision. c/d/i.  dermabond on top   Ext: SCD's in place and functional, non-tender b/l, no edema

## 2017-09-22 NOTE — PROGRESS NOTE ADULT - ASSESSMENT
A/P: 47y Female s/p Total Abdominal Hysterectomy, Bilateral salpingectomy, R oopherectomy, ROSIBEL, abdominal myomectomy.   Patient stable adn doing well post-operatively.
48y/o POD#1 from ex-lap, MAYDA, b/l salpingectomy, R oophorectomy, myomectomy, ROSIBEL in stable condition.
48y/o POD#2 from ex-lap, MAYDA, b/l salpingectomy, R oophorectomy, myomectomy, ROSIBEL in stable condition.

## 2017-09-22 NOTE — PROGRESS NOTE ADULT - PROBLEM SELECTOR PLAN 1
1. Neuro: PCA for pain  2. CV: Hemodynamically stable.  Monitor VS. CBC in AM.   3. Pulm: Saturating well on room air.  Encourage OOB and incentive spirometer use.   4. GI: Advance to regular diet in AM.  Clears for tonight. Anti-emetics PRN.  5. : Truong to gravity.   6. Electrolytes: LR@125cc/hr.  7. DVT ppx w/ SCD's while in bed. Early ambulation, initially with assistance then as tolerated. HSQ at 24 hours post-op.    8. Discharge from PACU when criteria met.     Vianney Auguste PGY-2
Neuro: c/w po pain meds  CV: Hemodynamically stable  Pulm: Saturating well on room air, encourage incentive spirometry  GI: c/w regular diet  : voiding spontaneously  Heme: c/w HSQ and SCDs for DVT ppx  Dispo: Continue routine post-op care, discharge home today    MAI Hernandez pgy1
Neuro: transition to po pain meds after patient is tolerating po  CV: Hemodynamically stable, AM CBC: if stable, will remove allan  Pulm: Saturating well on room air, encourage incentive spirometry  GI: Advance to regular diet  : UOP adequate, d/c allan based on cbc  Heme: c/w SCDs for DVT ppx. Encourage ambulation. Will give HSQ tonight  Dispo: Continue routine post-op care    MIA Hernandez pgy1

## 2017-09-28 LAB — SURGICAL PATHOLOGY STUDY: SIGNIFICANT CHANGE UP

## 2020-12-24 NOTE — H&P PST ADULT - NS HIV RISK FACTOR
Patient made aware her Rx was refilled on 12/18. Encouraged patient to check-in with her pharmacy. Patient voiced understanding. No

## 2024-10-21 NOTE — ASU PREOP CHECKLIST - PATIENT'S PERSONAL PROPERTY REMOVED
Alisha Hillman is a 65 year old female who presents today for a follow-up after excision of squamous cell cancer, anterior chest, intraoperative frozen section, 1.2 x 0.8 cm, complex layered closure, anterior chest 1.3 cm, dermal fat graft with Dr. Aponte on 10/15/2024.    She denies fever and chills. She denies nausea, vomiting, diarrhea or constipation.   Her pain is controlled.  She is here today for suture removal.    Pathology from 10/15/2024:  Final Diagnosis:   A.  Skin, chest, excision:  -There is no residuum of the previously diagnosed squamous cell carcinoma.  -Reparative changes consistent with previous operative site.     B.  Skin, chest, superior 11:00-1:00, excision:  -Benign skin.  -Negative for carcinoma.     C.  Skin, chest, inferior 5:00-7:00, excision:  -Benign skin.  -Negative for carcinoma.     Physical Exam     Anterior chest incision is clean, dry, and intact. No wound drainage or wound dehiscence. No erythema or ecchymosis. Prolene sutures in place.     There were no vitals filed for this visit.      Assessment and Plan     Alisha Hillman is doing well s/p excision of squamous cell cancer, anterior chest, intraoperative frozen section, 1.2 x 0.8 cm, complex layered closure, anterior chest 1.3 cm, dermal fat graft with Dr. Aponte on 10/15/2024.    The prolene sutures were removed today. The patient tolerated this well. New Steri-Strips were placed over the incision. She will replace these as needed.    She is taking a trip to [place]. She was encouraged to keep the incision covered at all times with Steri-Strips or Neosporin and Band-aid.     She will follow up with Dr. Aponte on 11/6. She was encouraged to contact our office with any questions or concerns.     Questions were answered. Patient understands.     Carrie CHISHOLM RN  10/22/2024  9:33 AM   
jewelry/all removed

## 2024-11-13 NOTE — PATIENT PROFILE ADULT. - PATIENT REPRESENTATIVE NAME
LVM informing patient to call the clinic at [phone], in order to reschedule 12/3/24 appointment time. 2:45p slot available, currently.   Lucinda Simpson

## 2025-07-19 NOTE — H&P PST ADULT - ATTENDING COMMENTS
7/17 initial labs with Na of 131 and BG > 500  Normal after correction of blood glucose   There is no change to the planned MAYDA/BS and right oophorectomy.